# Patient Record
Sex: FEMALE | Race: WHITE
[De-identification: names, ages, dates, MRNs, and addresses within clinical notes are randomized per-mention and may not be internally consistent; named-entity substitution may affect disease eponyms.]

---

## 2018-01-10 NOTE — RS.[email or web address]
Date of Note: 01/09/18


Visit #: 1


Date of Evaluation: 01/09/18


Payer Source: MEDICARE


Treatment Diagnosis: Frequent falls, LE weakness


History of Condition/Mechanism of Injury:: Patient reports having frequent 

falls.  States she has fallen four times in the last 6 months.


Prior Level of Function.....Patient was independent with: ADL's, Self Care, 

Caregiving, Ambulation/Mobility, Community Integration/Access


Functional Limitations: Lifting, Standing, Ambulation, Community Access/

Integration


Current Subjective/complaints:: States her sons wanted her to come to therapy 

because of her recent falls.  States her most recent fall was at the end of 

October 2017, when she was walking out to her car and she fell with no warning. 

Patient states she has a quad cane that she uses when she walks long distances. 

She feels like she is falling due to leg weakness.  States she has numbness in 

her feet, which comes and goes.  States her left hip is hurting. States she 

tripped not long ago and feels that she may have strained her hip. She is 

hopeful that Physical Therapy will get her stronger and help her avoid falls.





Medical History


Medical History: Hypertension, Diabetes


Medical History Comments:: Peripheral neuropathy


Surgical History: Hysterectomy


Surgical History Comments:: bilateral cataract surgery


Hx Home Medications: HCTZ, Ibuprofen PRN, Lisinopril,Metformin, Pravastatin





Functional Outcome Measure


Tinetti: 19 (19/28=32.1% impairment)


Other: 





Timed Get UP & GO: 13.5 seconds


Fall Risk Assessment on [employer] Balance Trainer score of 2.3 Overall Stability 

Index





- G Codes & Severity Modifier


G Codes & Modifier: Mobility current CJ.  Mobility goal CI


Source of G Code score: Tinetti Assessment shows her to be at Moderate risk for 

falls.





Observation





- Observation


Posture: Forward Head, Rounded Shoulders, Decreased Lumbar Lordosis


Handedness: Right





Gait





- Gait Pattern


Gait Comments: Patient ambulates without a cane in the department.  She 

demonstrates bilateral trunk lean laterally and minimal foot clearance during 

swing phase.  She demonstrates a wide base of support with ambulation.





General


Range of Motion: Patient demonstrates function AROM WFL's of trunk and LE's.


Muscle Strength: Bilateral hip strength is 4-/5 generally throughout.  

Bilateral quads 4/5, HS 4/5.  Ankles bilaterally 4/5.  Trunk strength 3+/5.





Sensation





- Sensation


Comments: 


States sensation to light touch and deep pressure is intact throughout 

bilateral LE's.  States numbness in her feet comes and goes.





Balance





- Sitting Balance


Static Sitting Balance: Good


Dynamic Sitting Balance: Good





- Standing Balance


Static Standing Balance: Good (-)


Dynamic Standing Balance: Good (-)





Coordination





- Tests


  ** Bilateral


Heel to Shin: Mild Deviation (most likely due to hip weakness)


Toe Tapping: Normal/Intact





Interventions





- Exercise/Activities/Manual Therapy


Exercises/Activities: Patient instructed in exercises for home of: seated hip 

fleixon, seated unsupported anterior/posterior trunk weight shifting, bridging, 

and standing hip abduction. Emphasized the need to use a secure surface to hold 

on to for the standing hip exercise.


Manual Therapy: NA


HOME EXERCISE PROGRAM: seated hip fleixon, seated unsupported anterior/

posterior trunk weight shifting, bridging, and standing hip abduction. 

Emphasized the need to use a secure surface to hold on to for the standing hip 

exercise.





- Charges


Timed Code Treatment Minutes: 55 mins


Total Treatment Time: 55 mins


Procedures billed for this date of service:: [facility]





Assessment


Assessment: Patient presents to therapy with a diagnosis of frequent falls. She 

demonstrates weakness in the LE's and trunk.  Tinetti Assessment presents her 

to be at a Moderate risk for falls.  She also presents to be at risk for falls 

with a score of 13.5 seconds on the Timed Up & Go test.  She demonstrates good 

potential to benefit from LE and trunk strengthening and balance activities to 

improve safety and decrease her risk for falls.


Patient Education: Education of diagnosis, Body/Joint mechanics, Home Exercise 

Program, Activity Modification, Education of Plan of Care


Rehab Potential: Good





Short Term Goals


Goal #1: Pt independent and compliant with HEP.


Goal to be met by: 01/24/18


Goal #2: Trunk strength 4/5.


Goal to be met by: 01/24/18


Goal #3: Bilateral hip strength generally 4/5.


Goal to be met by: 01/24/18





Long Term Goals


Goal #1: Pt knows HEP and to continue ex's to maintain level of function at D/C.


Goal to be met by: 02/19/18


Goal #2: Score on Tinetti Assessment improved to 24/28. 


Goal to be met by: 02/19/18


Goal #3: Pt to amb. community distances with AAD and good safety.


Goal to be met by: 02/19/18


Goal #4: Pt to report improved confidence in balance and safety.


Goal to be met by: 02/19/18





Plan





- Treatment to be Provided


Procedures: Therapeutic Exercises, Therapeutic Activity, Gait Training, 

Neuromuscular Rehab, Patient Education


Modalities: No Modalities





- Treatment Plan


Frequency: 2-3 X week


Duration: 4 weeks


ORDER # VISITS AND/OR THROUGH DATE: 2/19/18





- Treatment Code


(1) Leg weakness


Code(s): M62.81 - MUSCLE WEAKNESS (GENERALIZED)   


Qualifiers: 


   Laterality: bilateral   Qualified Code(s): R29.898 - Other symptoms and 

signs involving the musculoskeletal system   





(2) Frequent falls


Code(s): R29.6 - REPEATED FALLS   


Comments: 


R29.6








(3) Gait abnormality


Code(s): R26.9 - UNSPECIFIED ABNORMALITIES OF GAIT AND MOBILITY   


Comments: 


R26.9

## 2018-01-11 NOTE — RS.OPPTDN
Subjective


Date of Note: 01/11/18


Visit #: 2


Date of Evaluation: 01/09/18


Payer Source: MEDICARE


Treatment Diagnosis: Frequent falls, LE weakness


Current Subjective/complaints:: Patient reports right knee was sore this 

morning when she got up and left hip was a little sore following the evaluation.





Interventions





- Exercise/Activities/Manual Therapy


Exercises/Activities: Patient performed the following activites: seated 

resisted HS curls with red theraband, and resisted hip IR and ER with yellow 

theraband, 2 sets of 8 reps each. Also performed seated bilateral shoulder 

flexion with ball for trunk stability.  In supine, performs SAQ and alternate 

hip flexion with 2# weight, resisted HS curl and ankle DF, inversion and 

eversion with red theraband all 2 sets of 8 reps.  Patient performed dynamic 

balance activities in standing with assistance.  Steps forward and back and 

then laterally and back.  Patient is much more unsteady when standing on the 

left LE while moving the right.  She requires hand held assistance to perform 

the standing balance exercises.


Manual Therapy: NA


HOME EXERCISE PROGRAM: seated hip fleixon, seated unsupported anterior/

posterior trunk weight shifting, bridging, and standing hip abduction. 

Emphasized the need to use a secure surface to hold on to for the standing hip 

exercise.





- Charges


Timed Code Treatment Minutes: 35 mins


Total Treatment Time: 35 mins


Procedures billed for this date of service:: EX2


Assessment: Patient reports some left hip soreness following the evaluation.  

Patient tolerated all exercises well today.  Denies left hip pain during 

activities.  States she is performing her exercises at home.


Patient demonstrates compliance with HEP?: Yes





Short Term Goals


Goal #1: Pt independent and compliant with HEP.


Goal to be met by: 01/24/18


Progress towards Goal:: Progressing


Goal #2: Trunk strength 4/5.


Goal to be met by: 01/24/18


Goal #3: Bilateral hip strength generally 4/5.


Goal to be met by: 01/24/18





Long Term Goals


Goal #1: Pt knows HEP and to continue ex's to maintain level of function at D/C.


Goal to be met by: 02/19/18


Goal #2: Score on Tinetti Assessment improved to 24/28. 


Goal to be met by: 02/19/18


Goal #3: Pt to amb. community distances with AAD and good safety.


Goal to be met by: 02/19/18


Goal #4: Pt to report improved confidence in balance and safety.


Goal to be met by: 02/19/18





Plan


PLAN OF CARE EXPIRES ON:: 02/19/18


ORDER # VISITS AND/OR THROUGH DATE: 2/19/18


PLAN: Progress balance and strengthening activities to improve her stability 

and safety.

## 2018-01-18 NOTE — RS.OPPTDN
Subjective


Date of Note: 01/18/18


Visit #: 3


Date of Evaluation: 01/09/18


Payer Source: MEDICARE


Treatment Diagnosis: Frequent falls, LE weakness


Current Subjective/complaints:: Patient denies dizziness or unsteady gait since 

last session.  She says she has been performing HEP twice daily.





Pain Assessment





- Pain Description


Pain Location: stiffness to bilateral knees





Interventions





- Exercise/Activities/Manual Therapy


Exercises/Activities: In supine, passive R hamstring stretching x 3.  She 

performs SAQ and alternate hip flexion with 2# weight, resisted HS curl and 

ankle DF, inversion and eversion with red theraband all 2 sets of 8 reps.  Ball 

squeezes, isometric hip flexion and abd 2x10.  Sitting:  ham curls red tband 

and 1# wand for bilateral shoulder flexion and red tband for scap retraction x 

10.  Patient performed dynamic balance activities in standing with assistance.  

Steps forward and back and then laterally and back.  Only CGA intermittently 

required with backwards walking.  Marching in place at rail and heel/toe raises.


Total minutes of Exercise: 38


Manual Therapy: NA


HOME EXERCISE PROGRAM: seated hip fleixon, seated unsupported anterior/

posterior trunk weight shifting, bridging, and standing hip abduction. 

Emphasized the need to use a secure surface to hold on to for the standing hip 

exercise.





- Charges


Timed Code Treatment Minutes: 38


Total Treatment Time: 38


Procedures billed for this date of service:: ex3


Assessment: Patient without c/o dizziness or unsteadiness today.  She says she 

has not fallen since Halloween.  She is able to perform all therex well and 

without c/o.  She is able to maintain bal with dynamic exercises with exception 

of slight unsteadiness with backwards amb at one point.


Patient Education: Education of diagnosis, Body/Joint mechanics, Home Exercise 

Program, Home Safety


Patient demonstrates compliance with HEP?: Yes





Short Term Goals


Goal #1: Pt independent and compliant with HEP.


Goal to be met by: 01/24/18


Progress towards Goal:: Progressing


Goal #2: Trunk strength 4/5.


Goal to be met by: 01/24/18


Goal #3: Bilateral hip strength generally 4/5.


Goal to be met by: 01/24/18





Long Term Goals


Goal #1: Pt knows HEP and to continue ex's to maintain level of function at D/C.


Goal to be met by: 02/19/18


Goal #2: Score on Tinetti Assessment improved to 24/28. 


Goal to be met by: 02/19/18


Goal #3: Pt to amb. community distances with AAD and good safety.


Goal to be met by: 02/19/18


Goal #4: Pt to report improved confidence in balance and safety.


Goal to be met by: 02/19/18





Plan


PLAN OF CARE EXPIRES ON:: 02/19/18


ORDER # VISITS AND/OR THROUGH DATE: 2/19/18


PLAN: Patient to continue to work on thereSTinser for LE's strength and dynamic 

exercise for bal.

## 2018-01-23 NOTE — RS.OPPTDN
Subjective


Date of Note: 01/23/18


Visit #: 4


Date of Evaluation: 01/09/18


Payer Source: MEDICARE


Treatment Diagnosis: Frequent falls, LE weakness


Current Subjective/complaints:: Patient c/o stiffness to the R hip, but says 

she feels good.  Reports no significant pain.  Patient admits to performing HEP 

BID and has performed prior to coming in to PT this morning.





Pain Assessment





- Pain Description


Pain Location: R hip





- Heat/Cryotherapy


Treatment: Hot Pack (to the R hip in supine during stretches and exercises)





Interventions





- Exercise/Activities/Manual Therapy


Exercises/Activities: In supine, passive R hamstring stretching x 3.  She 

performs SAQ and alternate hip flexion with 2# weight, resisted HS curl and 

ankle DF, inversion and eversion with red theraband all 2 sets of 8 reps.  Ball 

squeezes, isometric hip flexion and abd 2x10.  Sitting:  ham curls red tband 

and 1# wand for bilateral shoulder flexion and red tband for scap retraction x 

10.  Patient performed dynamic balance activities in standing with assistance.  

Steps forward and back and then laterally and back.  Only CGA intermittently 

required with backwards walking.  Marching in place at rail and heel/toe raises 

and  hip abd x 10.  Initiated Stationary bike x 1.5 mins due to causing R knee 

soreness with flexion for and retro..


Total minutes of Exercise: 38


Manual Therapy: NA


HOME EXERCISE PROGRAM: seated hip fleixon, seated unsupported anterior/

posterior trunk weight shifting, bridging, and standing hip abduction. 

Emphasized the need to use a secure surface to hold on to for the standing hip 

exercise.





- Charges


Timed Code Treatment Minutes: 38


Total Treatment Time: 38


Procedures billed for this date of service:: ex3


Assessment: Patient presents with decreased R hip pain and only mild stiffness 

today, which did improve with moist heat and stretching.  HS (R) remain tight, 

but is showing increased flexibility.  She is able to perform all therex 

without LOB and should continue to improve with further dynamic exercise and LE 

strengthening.  May try stationary bike next session, but if causes further 

pain to the R knee can discontinue and focus on WBing/bal activities.


Patient Education: Education of diagnosis, Home Exercise Program


Patient demonstrates compliance with HEP?: Yes





Short Term Goals


Goal #1: Pt independent and compliant with HEP.


Goal to be met by: 01/24/18


Progress towards Goal:: Progressing


Goal #2: Trunk strength 4/5.


Goal to be met by: 01/24/18


Goal #3: Bilateral hip strength generally 4/5.


Goal to be met by: 01/24/18





Long Term Goals


Goal #1: Pt knows HEP and to continue ex's to maintain level of function at D/C.


Goal to be met by: 02/19/18


Goal #2: Score on Tinetti Assessment improved to 24/28. 


Goal to be met by: 02/19/18


Goal #3: Pt to amb. community distances with AAD and good safety.


Goal to be met by: 02/19/18


Goal #4: Pt to report improved confidence in balance and safety.


Goal to be met by: 02/19/18





Plan


PLAN OF CARE EXPIRES ON:: 02/19/18


ORDER # VISITS AND/OR THROUGH DATE: 2/19/18


PLAN: Patient to continue for bilateral LE strengthening and HS flexibility 

exercises.

## 2018-01-25 ENCOUNTER — HOSPITAL ENCOUNTER (OUTPATIENT)
Dept: HOSPITAL 58 - REHAB | Age: 73
LOS: 6 days | End: 2018-01-31
Attending: INTERNAL MEDICINE
Payer: COMMERCIAL

## 2018-01-25 DIAGNOSIS — R29.898: ICD-10-CM

## 2018-01-25 DIAGNOSIS — R29.6: ICD-10-CM

## 2018-01-25 DIAGNOSIS — M62.81: Primary | ICD-10-CM

## 2018-01-25 DIAGNOSIS — R26.9: ICD-10-CM

## 2018-01-25 NOTE — RS.OPPTDN
Subjective


Date of Note: 01/25/18


Visit #: 5


Date of Evaluation: 01/09/18


Payer Source: MEDICARE


Treatment Diagnosis: Frequent falls, LE weakness


Current Subjective/complaints:: Patient says she is hurting at the R anterior 

thigh and along the groin.  She feels this may be from beginning on exercise 

bike here.  She wishes to avoid it today to see if her symptoms decrease.





- Heat/Cryotherapy


Treatment: Hot Pack (15 mins to the R anterior thigh in supine)





Interventions





- Exercise/Activities/Manual Therapy


Exercises/Activities: In supine, passive R hamstring stretching and lower trunk 

rotation x 3.  She performs SAQ and alternate hip flexion with 2 1/2# weight, 

resisted HS curl and ankle DF green tband, inversion and eversion with green 

theraband all 2 sets of 10 reps.  Ball squeezes, isometric hip flexion and abd 

2x10.  Alternate LE lift 2 1/2# each 2x10.  Sitting:  ham curls red tband and 1

# wand for bilateral shoulder flexion and red tband for scap retraction x 10.  

Patient performed dynamic balance activities in standing with assistance.  

Steps forward and back and then laterally and back.  Only CGA intermittently 

required with backwards walking.  Marching in place at rail and heel/toe raises 

and  hip abd x 10.  Forward step board leading with L, then R foot x 10 each.  

Omitted bike today to verify any change in pain level.


Total minutes of Exercise: 38


Manual Therapy: NA


HOME EXERCISE PROGRAM: seated hip fleixon, seated unsupported anterior/

posterior trunk weight shifting, bridging, and standing hip abduction. 

Emphasized the need to use a secure surface to hold on to for the standing hip 

exercise.





- Charges


Timed Code Treatment Minutes: 38


Total Treatment Time: 38


Procedures billed for this date of service:: ex3


Assessment: Patient presents with increased soreness to the R anterior thigh 

extending into the groin.  Patient had relief after moist heat applied.  No 

difficulty emma stretches or other therex.  She is able to maintain sequence and 

bal during step board.  Progressed scapular retraction to green tband.


Patient Education: Body/Joint mechanics, Home Exercise Program


Patient demonstrates compliance with HEP?: Yes





Short Term Goals


Goal #1: Pt independent and compliant with HEP.


Goal to be met by: 01/24/18


Progress towards Goal:: Progressing


Goal #2: Trunk strength 4/5.


Goal to be met by: 01/24/18


Goal #3: Bilateral hip strength generally 4/5.


Goal to be met by: 01/24/18





Long Term Goals


Goal #1: Pt knows HEP and to continue ex's to maintain level of function at D/C.


Goal to be met by: 02/19/18


Goal #2: Score on Tinetti Assessment improved to 24/28. 


Goal to be met by: 02/19/18


Goal #3: Pt to amb. community distances with AAD and good safety.


Goal to be met by: 02/19/18


Goal #4: Pt to report improved confidence in balance and safety.


Goal to be met by: 02/19/18





Plan


PLAN OF CARE EXPIRES ON:: 02/19/18


ORDER # VISITS AND/OR THROUGH DATE: 2/19/18


PLAN: Continue to progress with trunk stability, general strengthening, and bal 

activities.

## 2018-02-01 NOTE — RS.OPPTDN
Subjective


Date of Note: 02/01/18


Visit #: 6


Date of Evaluation: 01/09/18


Payer Source: MEDICARE


Treatment Diagnosis: Frequent falls, LE weakness


Current Subjective/complaints:: Patient c/o soreness to both legs due to riding 

in car to/from [place], [place] and going up/down son's 14 steps.  She says she 

also has stiffness to the R hip as well.





Interventions





- Exercise/Activities/Manual Therapy


Exercises/Activities: In supine, passive R hamstring stretching and lower trunk 

rotation x 3.  She performs SAQ and alternate hip flexion with 2 1/2# weight, 

resisted HS curl and ankle DF green tband, inversion and eversion with green 

theraband all 2 sets of 10 reps.  Ball squeezes, isometric hip flexion and abd 

2x10.  Alternate LE lift 2 1/2# each 2x10.  Sitting:  ham curls green tband and 

2# wand for bilateral shoulder flexion and green tband for scap retraction x 

10.  Patient performed dynamic balance activities in standing with assistance.  

Steps forward and back and then laterally and back with 2# each leg.  Only CGA 

intermittently required with backwards walking.  Marching in place at rail and 

heel/toe raises and  hip abd with 2# ea x 10.  Stationary bike x 3 mins with 

seat scooted back.


Total minutes of Exercise: 38


Manual Therapy: NA


HOME EXERCISE PROGRAM: seated hip fleixon, seated unsupported anterior/

posterior trunk weight shifting, bridging, and standing hip abduction. 

Emphasized the need to use a secure surface to hold on to for the standing hip 

exercise.





- Charges


Timed Code Treatment Minutes: 38


Total Treatment Time: 38


Procedures billed for this date of service:: ex3


Assessment: Patient experiencing increased soreness to bilateral LEs related to 

climbing


Patient Education: Education of diagnosis, Home Exercise Program


Patient demonstrates compliance with HEP?: Yes





Short Term Goals


Goal #1: Pt independent and compliant with HEP.


Goal to be met by: 01/24/18


Progress towards Goal:: Progressing


Goal #2: Trunk strength 4/5.


Goal to be met by: 01/24/18


Progress towards Goal:: Progressing


Goal #3: Bilateral hip strength generally 4/5.


Goal to be met by: 01/24/18


Progress towards Goal:: Progressing





Long Term Goals


Goal #1: Pt knows HEP and to continue ex's to maintain level of function at D/C.


Goal to be met by: 02/19/18


Progress towards goal: Progressing


Goal #2: Score on Tinetti Assessment improved to 24/28. 


Goal to be met by: 02/19/18


Goal #3: Pt to amb. community distances with AAD and good safety.


Goal to be met by: 02/19/18


Goal #4: Pt to report improved confidence in balance and safety.


Goal to be met by: 02/19/18





Plan


PLAN OF CARE EXPIRES ON:: 02/19/18


ORDER # VISITS AND/OR THROUGH DATE: 2/19/18


PLAN: Patient to continue x 2 more weeks for strengthening to the LE's, trunk 

stability, and bal.

## 2018-02-02 NOTE — RS.OPPTDN
Subjective


Date of Note: 02/02/18


Visit #: 7


Date of Evaluation: 01/09/18


Payer Source: MEDICARE


Treatment Diagnosis: Frequent falls, LE weakness


Current Subjective/complaints:: Patient says she has already performed HEP this 

morning.  Reports performing 2-3 times daily.  Says she did not have as much 

trouble emma the exercise bike yesterday as she thought she would, but c/o 

soreness to bilateral thighs and L knee.





Interventions





- Exercise/Activities/Manual Therapy


Exercises/Activities: In supine, passive R hamstring stretching and lower trunk 

rotation x 3.  She performs SAQ and alternate hip flexion with 2 1/2# weight, 

resisted HS curl and ankle DF green tband, inversion and eversion with green 

theraband all 2 sets of 10 reps.  Ball squeezes, isometric hip flexion and abd 

2x10.  Alternate LE lift 2 1/2# each 2x10.  Sitting:  ham curls green tband and 

2# wand for bilateral shoulder flexion and green tband for scap retraction 2 x 

10.  Patient performed dynamic balance activities in standing with assistance.  

Steps forward and back and then laterally and back with 2# each leg.  Only CGA 

intermittently required with backwards walking.  Marching in place at rail and 

heel/toe raises and  hip abd with 2# ea x 10.  Multiple reps with 2# each LE 

for combination side step and marching 4 x 7 reps.


Total minutes of Exercise: 38


Manual Therapy: NA


HOME EXERCISE PROGRAM: seated hip fleixon, seated unsupported anterior/

posterior trunk weight shifting, bridging, and standing hip abduction. 

Emphasized the need to use a secure surface to hold on to for the standing hip 

exercise.





- Charges


Timed Code Treatment Minutes: 38


Total Treatment Time: 38


Procedures billed for this date of service:: ex3


Assessment: Patient very compliant with HeP.  She is having mild muscle fatigue 

and soreness to bilateral quads and to the L knee.  She is able to maintain 

good bal with resisted standing and dynamic exercises.  We avoided stationary 

bike due to having this soreness and also she was here 2 days in a row.


Patient Education: Home Exercise Program, Education of Plan of Care


Patient demonstrates compliance with HEP?: Yes





Short Term Goals


Goal #1: Pt independent and compliant with HEP.


Goal to be met by: 01/24/18


Progress towards Goal:: Progressing


Goal #2: Trunk strength 4/5.


Goal to be met by: 01/24/18


Progress towards Goal:: Progressing


Goal #3: Bilateral hip strength generally 4/5.


Goal to be met by: 01/24/18


Progress towards Goal:: Progressing





Long Term Goals


Goal #1: Pt knows HEP and to continue ex's to maintain level of function at D/C.


Goal to be met by: 02/19/18


Progress towards goal: Progressing


Goal #2: Score on Tinetti Assessment improved to 24/28. 


Goal to be met by: 02/19/18


Goal #3: Pt to amb. community distances with AAD and good safety.


Goal to be met by: 02/19/18


Goal #4: Pt to report improved confidence in balance and safety.


Goal to be met by: 02/19/18





Plan


PLAN OF CARE EXPIRES ON:: 02/19/18


ORDER # VISITS AND/OR THROUGH DATE: 2/19/18


PLAN: Patient to continue x 3 more sessions per order

## 2018-02-06 NOTE — RS.OPPTDN
Subjective


Date of Note: 02/06/18


Visit #: 8


Date of Evaluation: 01/09/18


Payer Source: MEDICARE


Treatment Diagnosis: Frequent falls, LE weakness


Current Subjective/complaints:: Patient says she is tired.  Reports she had a 

busy weekend.  Says she is gaining strength to the LE's and would like to 

continue with therapy after her current order is complete.





Interventions





- Exercise/Activities/Manual Therapy


Exercises/Activities: In supine, passive R hamstring stretching and lower trunk 

rotation x 3.  She performs SAQ and alternate hip flexion with 2 1/2# weight, 

resisted HS curl and ankle DF green tband, inversion and eversion with green 

theraband all 2 sets of 10 reps.  Ball squeezes, isometric hip flexion and abd 

2x10.  Alternate LE lift 2 1/2# each 2x10.  Sitting:  ham curls green tband and 

2# wand for bilateral shoulder flexion and green tband for scap retraction 2 x 

10.  Patient performed dynamic balance activities in standing with assistance.  

Steps forward and back and then laterally and back with 2# each leg.  Only CGA 

intermittently required with backwards walking.  Marching in place at rail and 

heel/toe raises and  hip abd with 2# ea x 10.  Stationary bike x 4 mins.


Total minutes of Exercise: 38


Manual Therapy: NA


HOME EXERCISE PROGRAM: seated hip fleixon, seated unsupported anterior/

posterior trunk weight shifting, bridging, and standing hip abduction. 

Emphasized the need to use a secure surface to hold on to for the standing hip 

exercise.





- Charges


Timed Code Treatment Minutes: 38


Total Treatment Time: 38


Procedures billed for this date of service:: ex3


Assessment: Patient fatigues easily with standing exercises today and requires 

constant assistance with holding R foot in place of the pedal on bike.  She is 

able to maintain improved bal in sitting while performing postural exercises 

with decreased posterior lean.


Patient Education: Home Exercise Program, Activity Modification


Patient demonstrates compliance with HEP?: Yes





Short Term Goals


Goal #1: Pt independent and compliant with HEP.


Goal to be met by: 01/24/18


Progress towards Goal:: Met


Goal #2: Trunk strength 4/5.


Goal to be met by: 01/24/18


Progress towards Goal:: Met


Goal #3: Bilateral hip strength generally 4/5.


Goal to be met by: 01/24/18


Progress towards Goal:: Met





Long Term Goals


Goal #1: Pt knows HEP and to continue ex's to maintain level of function at D/C.


Goal to be met by: 02/19/18


Progress towards goal: Progressing


Goal #2: Score on Tinetti Assessment improved to 24/28. 


Goal to be met by: 02/19/18


Goal #3: Pt to amb. community distances with AAD and good safety.


Goal to be met by: 02/19/18


Progress towards goal: Progressing


Goal #4: Pt to report improved confidence in balance and safety.


Goal to be met by: 02/19/18





Plan


PLAN OF CARE EXPIRES ON:: 02/19/18


ORDER # VISITS AND/OR THROUGH DATE: 2/19/18


PLAN: Continue therex x 1-2 more week(s)

## 2018-02-13 NOTE — RS.OPPTDN
Subjective


Date of Note: 02/13/18


Visit #: 10


Date of Evaluation: 01/09/18


Payer Source: MEDICARE


Treatment Diagnosis: Frequent falls, LE weakness


Current Subjective/complaints:: Patient says she has pain at the L hip from 

losing her bal on her toilet Sunday.  She says she went to stand up and fell 

back onto the seat and hurt her L shoulder, head, and L hip.  Reports shoulder 

and head is not bothering her now, just the hip.  She denies needing to see her 

MD.





Pain Assessment





- Pain Description


Pain Location: L hip





- Heat/Cryotherapy


Treatment: Hot Pack (around the L hip in sidelying )





Interventions





- Exercise/Activities/Manual Therapy


Exercises/Activities: In supine, passive R hamstring stretching and lower trunk 

rotation x 3.  She performs SAQ and alternate hip flexion with 2 1/2# weight, 

resisted HS curl and ankle DF with green theraband all 2 sets of 10 reps.  Ball 

squeezes, isometric hip flexion and abd 2x10.  Alternate LE lift 2 1/2# each 

2x10.  Sitting:  ham curls green tband and 2# wand for bilateral shoulder 

flexion and green tband for scap retraction 2 x 10. Lateral trunk lean with 

ball then to midline and leaning anteriorally for trunk strength.  Patient 

performed dynamic balance activities in standing.   Amb laterally HHA with 

minimal unsteadiness.   Marching in place at rail and heel/toe raises and  hip 

abd and extension,with 2# ea x 10.  Omitted bike due to patient request from L 

hip pain.  Reassessed Tinetti's Test and TUG.


Total minutes of Exercise: 45


Manual Therapy: NA


HOME EXERCISE PROGRAM: seated hip fleixon, seated unsupported anterior/

posterior trunk weight shifting, bridging, and standing hip abduction. 

Emphasized the need to use a secure surface to hold on to for the standing hip 

exercise.





- Objective Findings


Observations,measurements,etc.: TUG=12 sec's.  Tinetti=24/28





- Charges


Timed Code Treatment Minutes: 45


Total Treatment Time: 45


Procedures billed for this date of service:: hp, ex3


Assessment: Patient has moderate L hip pain related to falling down onto toilet 

when trying to rise Sunday.  She demo lateral hip shifting with amb in dept 

with decreased stance time on L LE.  TUG score and Tinetti's did improve from 

eval.  She is able to perform all ADLs at home without difficulty presently, 

but does c/o lack of confidence in being steady with community distances and 

prolonged standing and cooking for [employer] .  She could possibly 

benefit from adding 2 more weeks of PT progressing dynamic activity.


Patient Education: Body/Joint mechanics, Home Safety


Patient demonstrates compliance with HEP?: Yes





Short Term Goals


Goal #1: Pt independent and compliant with HEP.


Goal to be met by: 01/24/18


Progress towards Goal:: Met


Goal #2: Trunk strength 4/5.


Goal to be met by: 01/24/18


Progress towards Goal:: Met


Goal #3: Bilateral hip strength generally 4/5.


Goal to be met by: 01/24/18


Progress towards Goal:: Met





Long Term Goals


Goal #1: Pt knows HEP and to continue ex's to maintain level of function at D/C.


Goal to be met by: 02/19/18


Progress towards goal: Progressing


Goal #2: Score on Tinetti Assessment improved to 24/28. 


Goal to be met by: 02/19/18


Progress towards goal: Met


Goal #3: Pt to amb. community distances with AAD and good safety.


Goal to be met by: 02/19/18


Progress towards goal: Progressing


Goal #4: Pt to report improved confidence in balance and safety.


Goal to be met by: 02/19/18


Progress towards goal: Progressing





Plan


PLAN OF CARE EXPIRES ON:: 02/19/18


ORDER # VISITS AND/OR THROUGH DATE: 2/19/18


PLAN: Patient has completed current order.  She may benefit from adding 2 more 

weeks to her treatment plan if MD agrees.

## 2018-02-22 NOTE — RS.OPPTDN
Subjective


Date of Note: 18


Visit #: 11


Date of Evaluation: 18


Payer Source: MEDICARE


Treatment Diagnosis: Frequent falls, LE weakness


Current Subjective/complaints:: Patient says she continues to work on HEP while 

she has been on hold from therapy.  Lisa says she did have some dizziness this 

morning, but is better now.  She says she will be on her feet for a long time 

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  She says that she will 

try to take breaks and sit.





<PHYLLIS MOREIRA - Last Filed: 18 12:08>





Pain Assessment





- Pain Description


Pain Location: no pain c/o's





<PHYLLIS MOREIRA - Last Filed: 18 12:08>





Balance System Training





- Level 1 Postural Stability/Symmetry


  ** #1


Training Mode: Weight Shift Training


Vision: Eyes Open


Task: None


Platform Stability Level  (1-12): static


Stance: Narrow


Time: 1 min


Reps: 3





  ** #2


Training Mode: Postural Stability Training


Vision: Eyes Open


Task: None


Platform Stability Level  (1-12): static


Stance: Narrow


Time: 1 min


Reps: 3





- Level 2 Dynamic Weight Shifting


  ** #1


Training Mode: Limits of Stability


Skill Level  (1-3): 1


Platform Stability Level  (1-12): static


Reps: 2





  ** #2


Training Mode: Maze Control


Skill Level  (1-3): 1


Platform Stability Level  (1-12): static


Reps: 2





<PHYLLIS MOREIRA - Last Filed: 18 12:08>





Interventions





- Exercise/Activities/Manual Therapy


Exercises/Activities: Therex focused on Balance Trainer and GT/dynamic bal 

activities.  Began with Bal Trainer: See TAB.  Standing at railin# each LE 

for marching, heel raises, and hip abd with side stepping x 10.  Standing on 

Blue foam for heel raises, minisquats, weight shifting A/P and laterally.  Grey 

air pad for marching/weight shifting laterally and A/P.  Stepping over foam/air 

pads scattered on floor.  Walking through and around cones and over medium 

bolster x 3.  Ended with stationary bike for constant assistance for R foot in 

strap x 3 mins forward slow speed.


Total minutes of Exercise: 40


Manual Therapy: NA


HOME EXERCISE PROGRAM: seated hip fleixon, seated unsupported anterior/

posterior trunk weight shifting, bridging, and standing hip abduction. 

Emphasized the need to use a secure surface to hold on to for the standing hip 

exercise.





- Charges


Timed Code Treatment Minutes: 40


Total Treatment Time: 40


Procedures billed for this date of service:: ex, neuro2





<PHYLLIS MOREIRA - Last Filed: 18 12:08>


Assessment: Patient has been consistent with HEP and needs to focus on dynamic 

activity including Bal Trainer.  She has a new order to continue for 2 more 

weeks.  Bal Trainer reveals difficulty with shifting weight posteriorally and 

to the L.  She was able to step across most all objects with only coming in 

contact with 2 items, medium bolster and foam.  She was able to maintain bal 

however, but does get out of breath with Bal Trainer and WBing activities.  She 

should improve with further therex with this focus.


Patient Education: Home Exercise Program, Education of Plan of Care


Patient demonstrates compliance with HEP?: Yes





<PHYLLIS MOREIRA - Last Filed: 18 12:08>





Short Term Goals


Goal #1: Pt independent and compliant with HEP.


Goal to be met by: 18


Progress towards Goal:: Met


Goal #2: Trunk strength 4/5.


Goal to be met by: 18


Progress towards Goal:: Met


Goal #3: Bilateral hip strength generally 4/5.


Goal to be met by: 18


Progress towards Goal:: Met





<PHYLLIS MOREIRA - Last Filed: 18 12:08>


Goal #3: Bilateral hip strength generally 4+/5.


Goal to be met by: 18


Progress towards Goal:: Progressing





<BRENT GOMES - Last Filed: 18 13:35>





Long Term Goals


Goal #1: Pt knows HEP and to continue ex's to maintain level of function at D/C.


Goal to be met by: 18


Progress towards goal: Progressing


Goal #2: Score on Tinetti Assessment improved to 24/28. 


Goal to be met by: 18


Progress towards goal: Met


Goal #3: Pt to amb. community distances with AAD and good safety.


Goal to be met by: 18


Progress towards goal: Progressing


Goal #4: Pt to report improved confidence in balance and safety.


Goal to be met by: 18


Progress towards goal: Progressing





<PHYLLIS MOREIRA - Last Filed: 18 12:08>





Plan


PLAN OF CARE EXPIRES ON:: 18


ORDER # VISITS AND/OR THROUGH DATE: 18


PLAN: Continue for 2 more weeks with MD order to focus on dynamic activity and 

bal exercises





<PHYLLIS MOREIRA - Last Filed: 18 12:08>

## 2018-02-23 NOTE — RS.PTSUM
Progress Note/Summary


Date of Note: 02/13/18


Date of Evaluation: 01/09/18


Number of Visits: 10


Reporting Period for this Progress Note: 01/09/18 through 02/13/18


Current Complaints/Gains: Patient reports she is gaining strength and feels 

that she is steadier.  She says she recently fell back onto her toilet and hurt 

the left shoulder, hip, and head, but felt it was not bad enough to seek 

medical attention. She is requesting to continue therapy.


Objective Measurements/Presentation: Tinetti Assessment score 24/28.  

Demonstrates consistency with HEP, but some difficulty with balance exercises.  

Score 17% on Limits of Stability test, 32% for Maze control.  TUG 12 seconds.  

Patient continues to demonstrate bilateraly trunk lean with ambulation.  Static 

standing balance is good.  Dynamic standing balance is Good-.


G Codes: Mobility current CI.  Mobility goal CI


Source of G Code Score: Tinetti Assessment





- Short Term Goals


Goal #1: Pt independent and compliant with HEP.


Goal to be met by: 01/24/18


Progress towards Goal:: Met


Goal #2: Trunk strength 4/5.


Goal to be met by: 01/24/18


Progress towards Goal:: Met


Goal #3: Bilateral hip strength generally 4+/5


Goal to be met by: 03/02/18


Progress towards Goal:: Progressing





- Long Term Goals


Goal #1: Pt knows HEP and to continue ex's to maintain level of function at D/C.


Goal to be met by: 03/07/18


Progress towards goal: Progressing


Goal #2: Score on Tinetti Assessment improved to 24/28. 


Goal to be met by: 03/07/18


Progress towards goal: Met


Goal #3: Pt to amb. community distances with AAD and good safety.


Goal to be met by: 03/07/18


Progress towards goal: Progressing


Goal #4: Pt to report improved confidence in balance and safety.


Goal to be met by: 03/07/18


Progress towards goal: Progressing





- Assessment


Assessment of Improvement/Progress: Patient has made progress with all goals.  

She demonstrates the need for more advanced exercises and activities to 

challenge her dynamic balance.  She shows progress to gain more safety and 

confidence with her mobility/ambulation.





- Plan


Plan: Will request continuation of therapy sessions.


PLAN OF CARE EXPIRES ON:: 03/07/18


ORDER # VISITS AND/OR THROUGH DATE: 03/07/18

## 2018-02-28 ENCOUNTER — HOSPITAL ENCOUNTER (OUTPATIENT)
Dept: HOSPITAL 58 - REHAB | Age: 73
End: 2018-02-28
Attending: INTERNAL MEDICINE
Payer: COMMERCIAL

## 2018-02-28 DIAGNOSIS — R26.9: ICD-10-CM

## 2018-02-28 DIAGNOSIS — R29.6: Primary | ICD-10-CM

## 2018-02-28 NOTE — RS.OPPTDN
Subjective


Date of Note: 18


Visit #: 12


Date of Evaluation: 18


Payer Source: MEDICARE


Treatment Diagnosis: Frequent falls, LE weakness


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 

standing for [employer] event.  She says she finally had to sit down and go home 

due to fatigue.  She c/o intermittent dizziness today and increased L knee 

pain.  She reports being sore after her last session.





Balance System Training





- Level 1 Postural Stability/Symmetry


  ** #1


Training Mode: Weight Shift Training


Vision: Eyes Open


Task: None


Platform Stability Level  (1-12): 1: EASY


Stance: Narrow


Time: 1 to 1.5 mins


Reps: 3





- Level 2 Dynamic Weight Shifting


  ** #1


Training Mode: Limits of Stability


Skill Level  (1-3): 1:  EASY


Platform Stability Level  (1-12): static


Time: 1 to 1.5 mins


Reps: 3





  ** #3


Training Mode: Random Control


Skill Level  (1-3): 1:EASY


Platform Stability Level  (1-12): 1


Time: ~1 min


Reps: 2





Interventions





- Exercise/Activities/Manual Therapy


Exercises/Activities: Therex focused on Balance Trainer and GT/dynamic bal 

activities.  Continuned with Bal Trainer: See TAB.  Standing at railin# 

each LE for marching, heel raises, and hip abd with and incorporating high step 

sidestepping x 10.  Standing on Blue BALANCE  foam for heel raises, 

minisquats, weight shifting A/P and laterally.  Grey air pad for weight 

shifting laterally and A/P.  Shelf reaching with 1# on each arm reaching for 

bal at trunk to overhead x 10.  Side stepping again at railing with 2# each leg 

highstepping with constant cueing for posture and to look up and not at feet 

multiple reps.  Ended with Leg press 15# 2x10, 30# x 10.


Total minutes of Exercise: 38


Manual Therapy: NA


HOME EXERCISE PROGRAM: seated hip fleixon, seated unsupported anterior/

posterior trunk weight shifting, bridging, and standing hip abduction. 

Emphasized the need to use a secure surface to hold on to for the standing hip 

exercise.





- Charges


Timed Code Treatment Minutes: 38


Total Treatment Time: 38


Procedures billed for this date of service:: ex1, neuro2


Assessment: Patient admitting increased fatigue with recent [employer] cooking and 

standing for a prolonged period.  She demo intermittent dizziness and more 

difficulty with breathing during BAL  and STANDING EX.  She demo 

improvement with Limits of stability on BAL  from previous session to 26

% from 17% .  Difficulty with Random Control when shifting weight posteriorally 

by pushing her bottom outward to compensate.


Patient Education: Home Exercise Program, Home Safety, Education of Plan of Care


Patient demonstrates compliance with HEP?: Yes





Short Term Goals


Goal #1: Pt independent and compliant with HEP.


Goal to be met by: 18


Progress towards Goal:: Met


Goal #2: Trunk strength 4/5.


Goal to be met by: 18


Progress towards Goal:: Met


Goal #3: Bilateral hip strength generally 4+/5.


Goal to be met by: 18


Progress towards Goal:: Progressing





Long Term Goals


Goal #1: Pt knows HEP and to continue ex's to maintain level of function at D/C.


Goal to be met by: 18


Progress towards goal: Progressing


Goal #2: Score on Tinetti Assessment improved to 24/28. 


Goal to be met by: 18


Progress towards goal: Met


Goal #3: Pt to amb. community distances with AAD and good safety.


Goal to be met by: 18


Progress towards goal: Met


Goal #4: Pt to report improved confidence in balance and safety.


Goal to be met by: 18


Progress towards goal: Progressing





Plan


PLAN OF CARE EXPIRES ON:: 18


ORDER # VISITS AND/OR THROUGH DATE: 18


PLAN: Continue bal exercises x 4 more sessions.

## 2018-02-28 NOTE — RS.OPPTDN
Subjective


Date of Note: 18


Visit #: 13


Date of Evaluation: 18


Payer Source: MEDICARE


Treatment Diagnosis: Frequent falls, LE weakness


Current Subjective/complaints:: Patient reports that she did a lot of errand 

running yesterday and is fatigued.  She says she was dizzy Monday and yesterday

, but no longer this morning.  She c/o R knee pain and says she will probably 

be having a TKR.





Balance System Training





- Level 1 Postural Stability/Symmetry


  ** #1


Training Mode: Weight Shift Training


Platform Stability Level  (1-12): 1 L to R weight shift then A/P


Stance: Normal


Time: 1 to 1.5 mins


Reps: 3





- Level 2 Dynamic Weight Shifting


  ** #1


Training Mode: Maze Control


Skill Level  (1-3): 1


Platform Stability Level  (1-12): 1


Time: ~1 min


Reps: 3





  ** #3


Training Mode: Limits of Stability


Skill Level  (1-3): 1


Platform Stability Level  (1-12): 1


Time: ~ 1 min


Reps: 2





Interventions





- Exercise/Activities/Manual Therapy


Exercises/Activities: Therex focused on Balance Trainer and GT/dynamic bal 

activities.  Continuned with Bal Trainer: See TAB.  Standing at railin# 

each LE for marching, heel raises, and hip abd with and incorporating high step 

sidestepping x 10.  Standing on Blue BALANCE  foam for heel raises, 

minisquats, weight shifting A/P and laterally.    Shelf reaching with 3# 

therapy wand reaching for bal at trunk to overhead x 12.  Side stepping again 

at railing with 2# each leg highstepping with constant cueing for posture and 

to look up and not at feet multiple reps.  Leg presses for 15, 30, and 45# 

bilaterally x 10 each.  Finished with "obstacle course" amb over scattered 

items on the floor and walking around cones, making sudden stops and turning 

sharply.


Total minutes of Exercise: 40


Manual Therapy: NA


HOME EXERCISE PROGRAM: seated hip fleixon, seated unsupported anterior/

posterior trunk weight shifting, bridging, and standing hip abduction. 

Emphasized the need to use a secure surface to hold on to for the standing hip 

exercise.





- Charges


Timed Code Treatment Minutes: 40


Total Treatment Time: 40


Procedures billed for this date of service:: ex, neuro2


Assessment: Patient has progressed on Bal Trainer from last session to today 

regarding the Weight Shift program (L to R) from avg 46-64% to now 73-93% skill 

level.  Maze program from 17-32% to now 67% skill level.  She is able to 

maintain bal walking over items scattered on the floor and walking around cones 

with improved ease since last week.


Patient Education: Home Safety, Education of Plan of Care


Patient demonstrates compliance with HEP?: Yes





Short Term Goals


Goal #1: Pt independent and compliant with HEP.


Goal to be met by: 18


Progress towards Goal:: Met


Goal #2: Trunk strength 4/5.


Goal to be met by: 18


Progress towards Goal:: Met


Goal #3: Bilateral hip strength generally 4+/5.


Goal to be met by: 18


Progress towards Goal:: Progressing





Long Term Goals


Goal #1: Pt knows HEP and to continue ex's to maintain level of function at D/C.


Goal to be met by: 18


Progress towards goal: Progressing


Goal #2: Score on Tinetti Assessment improved to 24/28. 


Goal to be met by: 18


Progress towards goal: Met


Goal #3: Pt to amb. community distances with AAD and good safety.


Goal to be met by: 18


Progress towards goal: Met


Comments: Patient amb independently


Goal #4: Pt to report improved confidence in balance and safety.


Goal to be met by: 18


Progress towards goal: Progressing





Plan


PLAN OF CARE EXPIRES ON:: 18


ORDER # VISITS AND/OR THROUGH DATE: 18


PLAN: Patient to continue x 3 more sessions per continuation order through 3/7/

18.

## 2018-03-02 NOTE — RS.OPPTDN
Subjective


Date of Note: 18


Visit #: 14


Date of Evaluation: 18


Payer Source: MEDICARE


Treatment Diagnosis: Frequent falls, LE weakness


Current Subjective/complaints:: Patient says she did not have any soreness from 

increasing weight on leg press last session.  She says she has already 

performed HEP this morning and is going to buy ankle weights today to help her 

at home.  She says she is able to see improvement with gait and strength, but 

remains dizzy on shifting weight on our therapy air pad.





Interventions





- Exercise/Activities/Manual Therapy


Exercises/Activities: Omitted Bal Trainer today due to request and patient 

appears SOB, which increases with this activity.  Standing at railin 1/2# 

each LE for hip abd with and incorporating high step sidestepping x 10.  Single 

leg stance with 2 1/2# using only one hand on railing (opposite) up to 15 sec's 

x 2.  Wall push ups for scapular strengthening and bal x 10.  Sitting reaching 

for 3# ball at floor and above head and crossing midline.  Also, for diagnonals 

2x5 and holding up to 3-5 sec's for trunk control/strength.  3# therapy bar and 

2 1/2# ankle weights for alternate LE and bilateral shoulder flexion x 8.  

Standing:  Green tband for scap retraction and bilateral horizontal shoulder 

abd x 10. Shelf reach from trunk to overhead with 3# wand 2x10.  Forward step 

ups using L then R 2x5.  Lateral step ups leading with L x 5.  Grey air pad for 

weight shifting laterally and then A/P x 10 with cues for cervical 

neutralization.  Patient has less difficulty with dizziness on this activity 

compared to recent visits per reports and observation.   Ended with Leg press 30

# 2x10, 45# x 10.


Total minutes of Exercise: 38


Manual Therapy: NA


HOME EXERCISE PROGRAM: seated hip fleixon, seated unsupported anterior/

posterior trunk weight shifting, bridging, and standing hip abduction. 

Emphasized the need to use a secure surface to hold on to for the standing hip 

exercise.





- Charges


Timed Code Treatment Minutes: 38


Total Treatment Time: 38


Procedures billed for this date of service:: ex2, neuro1


Assessment: Patient demo improved trunk control with all therex mostly through 

sitting challenging UE/LE together and no dizziness now with Weight shifting on 

grey air pad.  She is able to progress weight on Leg press without soreness, 

standing therex and multi tasking activities as well as exercises above head 

are less difficult for patient and she is able to maintain bal with 

increasingly challenging neuro activity.


Patient Education: Education of diagnosis, Education of Plan of Care


Patient demonstrates compliance with HEP?: Yes





Short Term Goals


Goal #1: Pt independent and compliant with HEP.


Goal to be met by: 18


Progress towards Goal:: Met


Goal #2: Trunk strength 4/5.


Goal to be met by: 18


Progress towards Goal:: Met


Goal #3: Bilateral hip strength generally 4+/5.


Goal to be met by: 18





Long Term Goals


Goal #1: Pt knows HEP and to continue ex's to maintain level of function at D/C.


Goal to be met by: 18


Progress towards goal: Progressing


Goal #2: Score on Tinetti Assessment improved to 24/28. 


Goal to be met by: 18


Progress towards goal: Met


Goal #3: Pt to amb. community distances with AAD and good safety.


Goal to be met by: 18


Progress towards goal: Met


Goal #4: Pt to report improved confidence in balance and safety.


Goal to be met by: 18


Progress towards goal: Progressing





Plan


PLAN OF CARE EXPIRES ON:: 18


ORDER # VISITS AND/OR THROUGH DATE: 18


PLAN: Patient to continue for progressed bal activities next week and will then 

plan for discharge.

## 2018-03-06 ENCOUNTER — HOSPITAL ENCOUNTER (OUTPATIENT)
Dept: HOSPITAL 58 - REHAB | Age: 73
LOS: 25 days | End: 2018-03-31
Attending: INTERNAL MEDICINE

## 2018-03-06 DIAGNOSIS — E08.8: ICD-10-CM

## 2018-03-06 DIAGNOSIS — R26.9: ICD-10-CM

## 2018-03-06 DIAGNOSIS — R29.6: Primary | ICD-10-CM

## 2018-03-06 DIAGNOSIS — N18.3: ICD-10-CM

## 2018-03-06 DIAGNOSIS — I10: ICD-10-CM

## 2018-03-08 NOTE — RS.OPPTDN
Subjective


Date of Note: 03/06/18


Visit #: 15


Date of Evaluation: 01/09/18


Payer Source: MEDICARE


Treatment Diagnosis: Frequent falls, LE weakness


Current Subjective/complaints:: Patient says she feels good today.  Reports she 

has visited with family over the weekend that have she has not seen in some 

time and they have complimented how well she is walking and that she seemed 

much stronger.  She says she had purchased 2, 3# weights to work with at home.





Balance System Training





- Level 1 Postural Stability/Symmetry


  ** #1


Training Mode: Weight Shift Training


Vision: Eyes Open


Platform Stability Level  (1-12): 1


Stance: Narrow


Time: 1 to 1.5 mins


Reps: 3





- Level 2 Dynamic Weight Shifting


  ** #1


Training Mode: Limits of Stability


Skill Level  (1-3): 1 and 2


Platform Stability Level  (1-12): static


Time: 1 min


Reps: 3





  ** #3


Training Mode: Weight Shift


Skill Level  (1-3):  2:  A/P diagonal


Platform Stability Level  (1-12): 1


Time: 1-1.5 min


Reps: 3





Interventions





- Exercise/Activities/Manual Therapy


Exercises/Activities: See Balance Trainer Tab.  Hip abd at railing 

incorporating high step sidestepping with 3# ankle weights x 10.  Forward (

leading with R then vc's to sequence and switch to leading with the L) and side 

stepping on step board x 10 each.  Standing at shelf activity, 3# wand for 

trunk to overhead 2x10 reps.  Standing scap retraction with green tband 2x10, 

overhead pull downs with green tband 2x10.  Pull downs with alternating hip 

flexion x 10.   Standing bouncing ball against the wall x 10, Single leg stance 

with bouncing ball against the wall x 10.  Leg press beginning with 30#, 45#, 60

#, and 75# x 10 reps with rest breaks.


Total minutes of Exercise: 45


Manual Therapy: NA


HOME EXERCISE PROGRAM: seated hip fleixon, seated unsupported anterior/

posterior trunk weight shifting, bridging, and standing hip abduction. 

Emphasized the need to use a secure surface to hold on to for the standing hip 

exercise.





- Objective Findings


Observations,measurements,etc.: Tinetti Test  remains to be 24/28 or 15% 

impairment.  TUG test is now 10 sec's (at 10th visit was 12).  Patient does not 

rely on hand/arm rest when standing up or reaching back at home, but did so 

during test.





- Charges


Timed Code Treatment Minutes: 45


Total Treatment Time: 45


Procedures billed for this date of service:: neuro2, ex1


Assessment: Patient has demo improvement per Balance Trainer compared to last 

week in the category of weight shifting L to R and A/P now 78% accuracy from 64%

.  Also, slight improvement with Limits of Stability category from 24% accuracy 

to 29%.  She has performed both tests in the medium category instead of "easy" 

and is improving.


Patient Education: Home Exercise Program, Education of Plan of Care


Patient demonstrates compliance with HEP?: Yes





Short Term Goals


Goal #1: Pt independent and compliant with HEP.


Goal to be met by: 01/24/18


Progress towards Goal:: Met


Goal #2: Trunk strength 4/5.


Goal to be met by: 01/24/18


Progress towards Goal:: Met


Goal #3: Bilateral hip strength generally 4+/5.


Goal to be met by: 03/02/18


Progress towards Goal:: Met





Long Term Goals


Goal #1: Pt knows HEP and to continue ex's to maintain level of function at D/C.


Goal to be met by: 03/07/18


Progress towards goal: Met


Goal #2: Score on Tinetti Assessment improved to 24/28. 


Goal to be met by: 03/07/18


Progress towards goal: Met


Goal #3: Pt to amb. community distances with AAD and good safety.


Goal to be met by: 03/07/18


Progress towards goal: Met


Goal #4: Pt to report improved confidence in balance and safety.


Goal to be met by: 03/07/18


Progress towards goal: Met





Plan


PLAN OF CARE EXPIRES ON:: 03/07/18


ORDER # VISITS AND/OR THROUGH DATE: 03/07/18


PLAN: plan for d/c with second order to continue.  She has made significant 

progress and will continue to progress with HEP.

## 2018-03-08 NOTE — RS.CSNOTE
PT Case Note


Date of Note: 03/08/18


Title of document: Case Note


Note: Patient seen today only for reassessment for D/c.  No charges applied.  

TUG test was 10 sec's, Tinetti 24/80 or 15%, increased Weight Shift category (

medium) from 78% to 91% in the past week.  Limits of Stability category 

improved from 29% at medium level to 45%.  Patient mentions this has been a 

blessing to her and her  is even able to tell a difference in her gait 

and bal.  Plan to discharge with all goals met.

## 2018-03-15 NOTE — RS.OPPTDC
Date of Discharge: 03/08/18


Date of Evaluation: 01/09/18


Number of Visits: 15


Treatment Diagnosis: Frequent falls, LE weakness


Current Complaints/Gains: Patient states family and friends have commented that 

her gait and balance appear to have improved. She reports improved LE strength 

and she is very satisified with her results. Reports she is performing her HEP 

consistently and using weights and therabands.





Functional Outcome Measure


Tinetti: 24 (24/28=14% impairment)


Other: 


TUG 10.5 seconds





- G Codes & Severity Modifier


G Codes & Modifier: Mobility D/C CI.  Mobility Goal CI


Source of G Code score: Tinetti





Gait





- Gait Pattern


Gait Comments: Patient ambulates without assistive device.  Consistently 

demonstrates good foot clearance.  Demonstrates no loss of balance or deviation 

from straight path.





Interventions





- Exercise/Activities/Manual Therapy


Exercises/Activities: NA


Manual Therapy: NA


HOME EXERCISE PROGRAM: seated hip fleixon, seated unsupported anterior/

posterior trunk weight shifting, bridging, and standing hip abduction. 

Emphasized the need to use a secure surface to hold on to for the standing hip 

exercise.





- Objective Findings


Observations,measurements,etc.: Balance  scores improved with lateral 

weight shifting by 30% and  Limits of stability improved by 40%.  Demonst





- Charges


Timed Code Treatment Minutes: Na


Total Treatment Time: NA


Procedures billed for this date of service:: NA





Assessment


Assessment: Patient has made good progress with therapy.  Her Tinetti and TUG 

score improved, decreasing her risk for falls.  She is pleased with how much 

therapy has been of benefit to her walking and balance. She demonstrates the 

ability to continue with her HEP on her own.





Short Term Goals


Goal #1: Pt independent and compliant with HEP.


Goal to be met by: 01/24/18


Progress towards Goal:: Met


Goal #2: Trunk strength 4/5.


Goal to be met by: 01/24/18


Progress towards Goal:: Met


Goal #3: Bilateral hip strength generally 4+/5.


Goal to be met by: 03/02/18


Progress towards Goal:: Met





Long Term Goals


Goal #1: Pt knows HEP and to continue ex's to maintain level of function at D/C.


Goal to be met by: 03/07/18


Progress towards goal: Met


Goal #2: Score on Tinetti Assessment improved to 24/28. 


Goal to be met by: 03/07/18


Progress towards goal: Met


Goal #3: Pt to amb. community distances with AAD and good safety.


Goal to be met by: 03/07/18


Progress towards goal: Met


Goal #4: Pt to report improved confidence in balance and safety.


Goal to be met by: 03/07/18


Progress towards goal: Met





Plan


Reason for Discharge:: No Further Skilled Therapy Indicated